# Patient Record
Sex: MALE | URBAN - METROPOLITAN AREA
[De-identification: names, ages, dates, MRNs, and addresses within clinical notes are randomized per-mention and may not be internally consistent; named-entity substitution may affect disease eponyms.]

---

## 2020-08-27 ENCOUNTER — NEW PATIENT (OUTPATIENT)
Dept: URBAN - METROPOLITAN AREA CLINIC 64 | Facility: CLINIC | Age: 60
End: 2020-08-27
Payer: MEDICARE

## 2020-08-27 DIAGNOSIS — Z79.4 LONG TERM (CURRENT) USE OF INSULIN: ICD-10-CM

## 2020-08-27 DIAGNOSIS — H43.12 VITREOUS HEMORRHAGE, LEFT EYE: Primary | ICD-10-CM

## 2020-08-27 PROCEDURE — 92134 CPTRZ OPH DX IMG PST SGM RTA: CPT | Performed by: OPHTHALMOLOGY

## 2020-08-27 PROCEDURE — 67028 INJECTION EYE DRUG: CPT | Performed by: OPHTHALMOLOGY

## 2020-08-27 PROCEDURE — 92004 COMPRE OPH EXAM NEW PT 1/>: CPT | Performed by: OPHTHALMOLOGY

## 2020-08-27 PROCEDURE — 65800 DRAINAGE OF EYE: CPT | Performed by: OPHTHALMOLOGY

## 2020-08-27 ASSESSMENT — INTRAOCULAR PRESSURE
OS: 15
OD: 14

## 2020-10-13 ENCOUNTER — FOLLOW UP ESTABLISHED (OUTPATIENT)
Dept: URBAN - METROPOLITAN AREA CLINIC 64 | Facility: CLINIC | Age: 60
End: 2020-10-13
Payer: MEDICARE

## 2020-10-13 DIAGNOSIS — H40.1133 PRIMARY OPEN-ANGLE GLAUCOMA, BILATERAL, SEVERE STAGE: ICD-10-CM

## 2020-10-13 PROCEDURE — 92250 FUNDUS PHOTOGRAPHY W/I&R: CPT | Performed by: OPHTHALMOLOGY

## 2020-10-13 PROCEDURE — 67028 INJECTION EYE DRUG: CPT | Performed by: OPHTHALMOLOGY

## 2020-10-13 PROCEDURE — 92014 COMPRE OPH EXAM EST PT 1/>: CPT | Performed by: OPHTHALMOLOGY

## 2020-10-13 PROCEDURE — 65800 DRAINAGE OF EYE: CPT | Performed by: OPHTHALMOLOGY

## 2020-10-13 PROCEDURE — 92235 FLUORESCEIN ANGRPH MLTIFRAME: CPT | Performed by: OPHTHALMOLOGY

## 2020-10-13 ASSESSMENT — INTRAOCULAR PRESSURE
OS: 12
OD: 16
OS: 13

## 2020-12-17 ENCOUNTER — FOLLOW UP ESTABLISHED (OUTPATIENT)
Dept: URBAN - METROPOLITAN AREA CLINIC 64 | Facility: CLINIC | Age: 60
End: 2020-12-17
Payer: MEDICARE

## 2020-12-17 DIAGNOSIS — E11.3513 TYPE 2 DIABETES MELLITUS WITH PROLIFERATIVE DIABETIC RETINOPATHY WITH MACULAR EDEMA, BILATERAL: Primary | ICD-10-CM

## 2020-12-17 PROCEDURE — 92134 CPTRZ OPH DX IMG PST SGM RTA: CPT | Performed by: OPHTHALMOLOGY

## 2020-12-17 PROCEDURE — 65800 DRAINAGE OF EYE: CPT | Performed by: OPHTHALMOLOGY

## 2020-12-17 PROCEDURE — 67028 INJECTION EYE DRUG: CPT | Performed by: OPHTHALMOLOGY

## 2020-12-17 RX ORDER — BRIMONIDINE TARTRATE 2 MG/ML
0.2 % SOLUTION/ DROPS OPHTHALMIC
Qty: 0 | Refills: 0 | Status: ACTIVE
Start: 2020-12-17

## 2020-12-17 ASSESSMENT — INTRAOCULAR PRESSURE
OS: 14
OS: 10
OD: 14

## 2021-03-02 ENCOUNTER — FOLLOW UP ESTABLISHED (OUTPATIENT)
Dept: URBAN - METROPOLITAN AREA CLINIC 64 | Facility: CLINIC | Age: 61
End: 2021-03-02
Payer: MEDICARE

## 2021-03-02 PROCEDURE — 99214 OFFICE O/P EST MOD 30 MIN: CPT | Performed by: OPHTHALMOLOGY

## 2021-03-02 PROCEDURE — 92134 CPTRZ OPH DX IMG PST SGM RTA: CPT | Performed by: OPHTHALMOLOGY

## 2021-03-02 PROCEDURE — 65800 DRAINAGE OF EYE: CPT | Performed by: OPHTHALMOLOGY

## 2021-03-02 PROCEDURE — 67028 INJECTION EYE DRUG: CPT | Performed by: OPHTHALMOLOGY

## 2021-03-02 ASSESSMENT — INTRAOCULAR PRESSURE
OD: 12
OS: 11
OS: 10

## 2021-06-17 ENCOUNTER — OFFICE VISIT (OUTPATIENT)
Dept: URBAN - METROPOLITAN AREA CLINIC 64 | Facility: CLINIC | Age: 61
End: 2021-06-17
Payer: MEDICARE

## 2021-06-17 PROCEDURE — 67028 INJECTION EYE DRUG: CPT | Performed by: OPHTHALMOLOGY

## 2021-06-17 PROCEDURE — 92134 CPTRZ OPH DX IMG PST SGM RTA: CPT | Performed by: OPHTHALMOLOGY

## 2021-06-17 PROCEDURE — 99214 OFFICE O/P EST MOD 30 MIN: CPT | Performed by: OPHTHALMOLOGY

## 2021-06-17 PROCEDURE — 65800 DRAINAGE OF EYE: CPT | Performed by: OPHTHALMOLOGY

## 2021-06-17 ASSESSMENT — INTRAOCULAR PRESSURE
OS: 17
OS: 10
OD: 14

## 2021-06-17 NOTE — IMPRESSION/PLAN
Impression: PDR w PALLOR nerves, BLIND OD - Prior PRP OU - Vit hmg OS -  (MUCH imprv'd w inj Camila Niall xAug '20) Plan: Hx:[[DM POOR CONTROL prior - now 6s, 7s. NLP OD PALLOR NERVE /SEVERE prior issues LIMIT - frm [facility] w VIT HMG/VA loss only good eye OS Aug '20 - Tx w inj -- H/o PRP DENSE and VIT surg prior tx w inj --Cont conservative mngmt (risk SURG TOO HIGH)  Extend / maintain]]. TODAY Avastin OS inj (proc note) w AC Tap to 10 (Vit tap)      Future Exam ?  
      RTC [facility] check VA and IOP 3-4w. RTC [facility] 14w ND/inj/OCT  plan Avastin  OS w AC / Vit TAP - Extend / Maintain q3m ?

## 2021-08-26 ENCOUNTER — OFFICE VISIT (OUTPATIENT)
Dept: URBAN - METROPOLITAN AREA CLINIC 64 | Facility: CLINIC | Age: 61
End: 2021-08-26
Payer: MEDICARE

## 2021-08-26 PROCEDURE — 92134 CPTRZ OPH DX IMG PST SGM RTA: CPT | Performed by: OPHTHALMOLOGY

## 2021-08-26 PROCEDURE — 65800 DRAINAGE OF EYE: CPT | Performed by: OPHTHALMOLOGY

## 2021-08-26 PROCEDURE — 67028 INJECTION EYE DRUG: CPT | Performed by: OPHTHALMOLOGY

## 2021-08-26 RX ORDER — GABAPENTIN 300 MG/1
300 MG CAPSULE ORAL
Qty: 0 | Refills: 0 | Status: ACTIVE
Start: 2021-08-26

## 2021-08-26 ASSESSMENT — INTRAOCULAR PRESSURE
OS: 10
OD: 12

## 2021-08-26 NOTE — IMPRESSION/PLAN
Impression: Primary open-angle glaucoma, severe Plan:  DEEP C/d, rim thin. MUST stay on Gtts as per [address] Today TAP AC to 9 OS.   LIMITING issue

## 2021-12-07 ENCOUNTER — OFFICE VISIT (OUTPATIENT)
Dept: URBAN - METROPOLITAN AREA CLINIC 64 | Facility: CLINIC | Age: 61
End: 2021-12-07
Payer: MEDICARE

## 2021-12-07 PROCEDURE — 67028 INJECTION EYE DRUG: CPT | Performed by: OPHTHALMOLOGY

## 2021-12-07 PROCEDURE — 99214 OFFICE O/P EST MOD 30 MIN: CPT | Performed by: OPHTHALMOLOGY

## 2021-12-07 PROCEDURE — 92134 CPTRZ OPH DX IMG PST SGM RTA: CPT | Performed by: OPHTHALMOLOGY

## 2021-12-07 PROCEDURE — 65800 DRAINAGE OF EYE: CPT | Performed by: OPHTHALMOLOGY

## 2021-12-07 ASSESSMENT — INTRAOCULAR PRESSURE
OS: 10
OS: 14
OD: 18

## 2021-12-07 NOTE — IMPRESSION/PLAN
Impression: PDR w PALLOR nerves, BLIND OD - Prior PRP OU - Vit hmg OS -  (MUCH imprv'd w inj Lei Poster xAug '20) Plan: Hx:[[DM POOR CONTROL prior - now 6s, 7s. NLP OD PALLOR NERVE /SEVERE prior issues LIMIT - frm [facility] w VIT HMG/VA loss only good eye OS Aug '20 - Tx w inj -- H/o PRP DENSE and VIT surg prior tx w inj --Cont conservative mngmt (risk SURG TOO HIGH)  Extend / maintain]]. TODAY Avastin OS inj (proc note) w AC Tap to 10 (Vit tap)      Future ND??  
      RTC [facility] check VA and IOP 3-4w. RTC [facility] 14w pos HRA / plan Avstn OS w AC/ Vit TAP - Extend/Maintain q3m ?

## 2021-12-07 NOTE — IMPRESSION/PLAN
Impression: Primary open-angle glaucoma, severe Plan: DEEP C/d, rim thin on REPEATED EXAM eval TODAY. REMINDED pt:  MUST stay on Gtts as per [address] Today TAP AC to 10 OS.   LIMITING issue

## 2022-03-29 ENCOUNTER — OFFICE VISIT (OUTPATIENT)
Dept: URBAN - METROPOLITAN AREA CLINIC 64 | Facility: CLINIC | Age: 62
End: 2022-03-29
Payer: MEDICARE

## 2022-03-29 PROCEDURE — 65800 DRAINAGE OF EYE: CPT | Performed by: OPHTHALMOLOGY

## 2022-03-29 PROCEDURE — 67028 INJECTION EYE DRUG: CPT | Performed by: OPHTHALMOLOGY

## 2022-03-29 PROCEDURE — 92134 CPTRZ OPH DX IMG PST SGM RTA: CPT | Performed by: OPHTHALMOLOGY

## 2022-03-29 PROCEDURE — 92242 FLUORESCEIN&ICG ANGIOGRAPHY: CPT | Performed by: OPHTHALMOLOGY

## 2022-03-29 PROCEDURE — 92250 FUNDUS PHOTOGRAPHY W/I&R: CPT | Performed by: OPHTHALMOLOGY

## 2022-03-29 ASSESSMENT — INTRAOCULAR PRESSURE
OS: 9
OD: 13
OS: 10

## 2022-03-29 NOTE — IMPRESSION/PLAN
Impression: Primary open-angle glaucoma, severe Plan: DEEP C/d, rim thin  REMINDED pt:  MUST stay on Gtts as per [address] Today TAP AC to 9 OS.   LIMITING issue

## 2022-03-29 NOTE — IMPRESSION/PLAN
Impression: PDR w PALLOR nerves, BLIND OD - Prior PRP OU - Vit hmg OS -  (MUCH imprv'd w inj Nereida Jarrell xAug '20) Plan: Hx:[[DM POOR CONTROL prior - now 6s, 7s. NLP OD PALLOR NERVE /SEVERE prior issues LIMIT - frm [facility] w VIT HMG/VA loss only good eye OS Aug '20 - Tx w inj -- H/o PRP DENSE and VIT surg prior tx w inj --Cont conservative mngmt (risk SURG TOO HIGH)  Extend / maintain]]. TODAY Avastin OS inj (proc note) w AC Tap to 10 (Vit tap)      Future ND??  
      RTC [facility] check VA and IOP 3-4w. RTC [facility] 14w ND/OCT/plan Avstn OS w AC/ Vit TAP - Extend/Maintain q3m ?

## 2022-07-19 ENCOUNTER — OFFICE VISIT (OUTPATIENT)
Dept: URBAN - METROPOLITAN AREA CLINIC 64 | Facility: CLINIC | Age: 62
End: 2022-07-19
Payer: MEDICARE

## 2022-07-19 DIAGNOSIS — H40.1133 PRIMARY OPEN-ANGLE GLAUCOMA, BILATERAL, SEVERE STAGE: ICD-10-CM

## 2022-07-19 DIAGNOSIS — E11.3513 TYPE 2 DIABETES MELLITUS WITH PROLIFERATIVE DIABETIC RETINOPATHY WITH MACULAR EDEMA, BILATERAL: Primary | ICD-10-CM

## 2022-07-19 PROCEDURE — 99214 OFFICE O/P EST MOD 30 MIN: CPT | Performed by: OPHTHALMOLOGY

## 2022-07-19 PROCEDURE — 67028 INJECTION EYE DRUG: CPT | Performed by: OPHTHALMOLOGY

## 2022-07-19 PROCEDURE — 92134 CPTRZ OPH DX IMG PST SGM RTA: CPT | Performed by: OPHTHALMOLOGY

## 2022-07-19 ASSESSMENT — INTRAOCULAR PRESSURE
OS: 9
OD: 15

## 2022-07-19 NOTE — IMPRESSION/PLAN
Impression: Primary open-angle glaucoma, severe Plan: DEEP C/d, rim thin  REMINDED pt:  MUST stay on Gtts as per [address] Repeated exam shows no nerve hmg and IOP fair, tapped again (See below) NO VIT HMG or NV worsening and no DME recur. Today TAP AC to 9 OS. LIMITING issue  -- ASK [place] or HALEYJ to exam as well.   SONALI

## 2022-07-19 NOTE — IMPRESSION/PLAN
Impression: PDR w PALLOR -  BLIND OD - Prior PRP OU - Vit hmg OS -  (MUCH imprv'd w inj Domingo Lesley xAug '20) Plan: Hx:[[DM POOR CONTROL prior - now 6s, 7s. NLP OD PALLOR NERVE /SEVERE prior issues LIMIT - frm [facility] w VIT HMG/VA loss only good eye OS Aug '20 - Tx w inj -- H/o PRP DENSE and VIT surg prior tx w inj --Cont conservative mngmt (risk SURG TOO HIGH)  Extend / maintain]]. TODAY Avastin OS inj (proc note) w AC Tap to 9 (Vit tap)    Future ND ?? RTC [facility] 14w dil, OCT, eval -h/o Avstn OS w AC/Vit TAP - Maintain q3m ? 
    KEEP f/u  [facility] check VA and IOP

## 2022-08-26 ENCOUNTER — OFFICE VISIT (OUTPATIENT)
Dept: URBAN - METROPOLITAN AREA CLINIC 64 | Facility: CLINIC | Age: 62
End: 2022-08-26
Payer: MEDICARE

## 2022-08-26 DIAGNOSIS — H26.492 OTHER SECONDARY CATARACT, LEFT EYE: ICD-10-CM

## 2022-08-26 DIAGNOSIS — Z79.4 LONG TERM (CURRENT) USE OF INSULIN: ICD-10-CM

## 2022-08-26 DIAGNOSIS — E11.3513 TYPE 2 DIAB WITH PROLIF DIAB RTNOP WITH MACULAR EDEMA, BI: ICD-10-CM

## 2022-08-26 DIAGNOSIS — H40.1133 PRIMARY OPEN-ANGLE GLAUCOMA, BILATERAL, SEVERE STAGE: Primary | ICD-10-CM

## 2022-08-26 DIAGNOSIS — H43.12 VITREOUS HEMORRHAGE, LEFT EYE: ICD-10-CM

## 2022-08-26 PROCEDURE — 99214 OFFICE O/P EST MOD 30 MIN: CPT | Performed by: STUDENT IN AN ORGANIZED HEALTH CARE EDUCATION/TRAINING PROGRAM

## 2022-08-26 ASSESSMENT — INTRAOCULAR PRESSURE
OS: 7
OD: 9

## 2022-08-26 NOTE — IMPRESSION/PLAN
Impression: Primary open-angle glaucoma, severe Plan: Today IOP is stable, end stage disease. Recommend continued topicals -Brimonidine OU BID 
-Latanoprost OU Qhs

Follow up 3 months IOP check
-Attempt a [facility] 10-2

## 2022-08-26 NOTE — IMPRESSION/PLAN
Impression: PDR w PALLOR -  BLIND OD - Prior PRP OU - Vit hmg OS -  (MUCH imprv'd w inj Green Valeriano xAug '20) Plan: RTC [facility] 14w dil, OCT, eval -h/o Avstn OS w AC/Vit TAP - every 3 months

## 2022-08-26 NOTE — IMPRESSION/PLAN
Impression: Other secondary cataract, left eye: H26.492. Plan: Yag would benefit patient potentially with more light to the retina and would make viewing easier for disease management. Recomend yag cap OS Discussed condition in detail. Recommend YAG. Discussed procedure, risks, and benefits with patient. Advised treatment can increase floaters that may be permanent, pt understands. Patient elects to proceed.   Schedule YAG

## 2022-08-26 NOTE — IMPRESSION/PLAN
Impression: Type 2 diab with prolif diab rtnop with macular edema, bi: J13.9477.  Plan: Continue to follow with Retina for injections

## 2022-09-21 ENCOUNTER — POST-OPERATIVE VISIT (OUTPATIENT)
Dept: URBAN - METROPOLITAN AREA CLINIC 64 | Facility: CLINIC | Age: 62
End: 2022-09-21
Payer: MEDICARE

## 2022-09-21 DIAGNOSIS — Z48.810 ENCOUNTER FOR SURGICAL AFTERCARE FOLLOWING SURGERY ON A SENSE ORGAN: Primary | ICD-10-CM

## 2022-09-21 PROCEDURE — 99024 POSTOP FOLLOW-UP VISIT: CPT | Performed by: OPTOMETRIST

## 2022-09-21 ASSESSMENT — INTRAOCULAR PRESSURE
OS: 10
OD: 20

## 2022-09-21 NOTE — IMPRESSION/PLAN
Impression:  Encounter for surgical aftercare following surgery on a sense organ  Z48.810. Plan: Keep FU with retina as scheduled.

## 2022-10-20 ENCOUNTER — OFFICE VISIT (OUTPATIENT)
Dept: URBAN - METROPOLITAN AREA CLINIC 64 | Facility: CLINIC | Age: 62
End: 2022-10-20
Payer: MEDICARE

## 2022-10-20 DIAGNOSIS — E11.3513 TYPE 2 DIABETES MELLITUS WITH PROLIFERATIVE DIABETIC RETINOPATHY WITH MACULAR EDEMA, BILATERAL: Primary | ICD-10-CM

## 2022-10-20 DIAGNOSIS — H40.1133 PRIMARY OPEN-ANGLE GLAUCOMA, BILATERAL, SEVERE STAGE: ICD-10-CM

## 2022-10-20 DIAGNOSIS — Z79.4 LONG TERM (CURRENT) USE OF INSULIN: ICD-10-CM

## 2022-10-20 PROCEDURE — 99214 OFFICE O/P EST MOD 30 MIN: CPT | Performed by: OPHTHALMOLOGY

## 2022-10-20 PROCEDURE — 67028 INJECTION EYE DRUG: CPT | Performed by: OPHTHALMOLOGY

## 2022-10-20 PROCEDURE — 65800 DRAINAGE OF EYE: CPT | Performed by: OPHTHALMOLOGY

## 2022-10-20 PROCEDURE — 92134 CPTRZ OPH DX IMG PST SGM RTA: CPT | Performed by: OPHTHALMOLOGY

## 2022-10-20 ASSESSMENT — INTRAOCULAR PRESSURE
OS: 8
OD: 7

## 2022-10-20 NOTE — IMPRESSION/PLAN
Impression: PDR w PALLOR -  BLIND OD - Prior PRP OU - Vit hmg OS -  (MUCH imprv'd w inj Nolene Ny xAug '20) Plan: Hx:[[DM POOR CONTROL prior - now 6s, 7s. NLP OD PALLOR NERVE /SEVERE prior issues LIMIT - frm [facility] w VIT HMG/VA loss only good eye OS Aug '20 - Tx w inj -- H/o PRP DENSE and VIT surg prior tx w inj --Cont conservative mngmt (risk SURG TOO HIGH)  Extend / maintain]]. TODAY Avstn OS inj (proc note) w AC Tap to 8 (Vit tap)    Future ND ?? RTC [facility] 14w pos FA / plan Avstn OS w AC/Vit TAP - Maintain q3m ? 
    KEEP f/u  [facility] check VA and IOP

## 2022-10-20 NOTE — IMPRESSION/PLAN
Impression: Primary open-angle glaucoma, severe Plan: TODAY repeat exam of nerves -- DEEP C/d, rim thin  REMINDED pt:  MUST stay on Gtts as per [address] [facility]. Today TAP AC to 8 OS w injx . . .  THIS REMAINS A LIMITING issue

## 2022-11-15 ENCOUNTER — OFFICE VISIT (OUTPATIENT)
Dept: URBAN - METROPOLITAN AREA CLINIC 64 | Facility: CLINIC | Age: 62
End: 2022-11-15
Payer: MEDICARE

## 2022-11-15 DIAGNOSIS — E11.3513 TYPE 2 DIABETES MELLITUS WITH PROLIFERATIVE DIABETIC RETINOPATHY WITH MACULAR EDEMA, BILATERAL: ICD-10-CM

## 2022-11-15 DIAGNOSIS — H40.1133 PRIMARY OPEN-ANGLE GLAUCOMA, BILATERAL, SEVERE STAGE: Primary | ICD-10-CM

## 2022-11-15 DIAGNOSIS — H43.12 VITREOUS HEMORRHAGE, LEFT EYE: ICD-10-CM

## 2022-11-15 PROCEDURE — 92083 EXTENDED VISUAL FIELD XM: CPT | Performed by: STUDENT IN AN ORGANIZED HEALTH CARE EDUCATION/TRAINING PROGRAM

## 2022-11-15 PROCEDURE — 99214 OFFICE O/P EST MOD 30 MIN: CPT | Performed by: STUDENT IN AN ORGANIZED HEALTH CARE EDUCATION/TRAINING PROGRAM

## 2022-11-15 PROCEDURE — 92133 CPTRZD OPH DX IMG PST SGM ON: CPT | Performed by: STUDENT IN AN ORGANIZED HEALTH CARE EDUCATION/TRAINING PROGRAM

## 2022-11-15 ASSESSMENT — INTRAOCULAR PRESSURE
OS: 11
OD: 15

## 2022-11-15 NOTE — IMPRESSION/PLAN
Impression: PDR w PALLOR -  BLIND OD - Prior PRP OU - Vit hmg OS -  (MUCH imprv'd w inj Camila Brooks '20) Plan: Continue all care with Dr Camila Tierney for Avastin OS

## 2022-11-15 NOTE — IMPRESSION/PLAN
Impression: Type 2 diabetes mellitus with proliferative diabetic retinopathy with macular edema, bilateral: D96.0816.  Plan:

## 2022-11-15 NOTE — IMPRESSION/PLAN
Impression: Primary open-angle glaucoma, bilateral, severe stage: U15.1351. Plan: Complete NLP OD, Today 10-2 with central island remains, Baseline scan, repeat in 6 months
-Continue Brimonidine OU BID 
-Latanoprost OU Qhs

Follow up 3 months IOP check

## 2023-01-26 ENCOUNTER — OFFICE VISIT (OUTPATIENT)
Dept: URBAN - METROPOLITAN AREA CLINIC 64 | Facility: CLINIC | Age: 63
End: 2023-01-26
Payer: MEDICARE

## 2023-01-26 DIAGNOSIS — E11.3513 TYPE 2 DIABETES MELLITUS WITH PROLIFERATIVE DIABETIC RETINOPATHY WITH MACULAR EDEMA, BILATERAL: Primary | ICD-10-CM

## 2023-01-26 DIAGNOSIS — H40.1133 PRIMARY OPEN-ANGLE GLAUCOMA, BILATERAL, SEVERE STAGE: ICD-10-CM

## 2023-01-26 PROCEDURE — 92134 CPTRZ OPH DX IMG PST SGM RTA: CPT | Performed by: OPHTHALMOLOGY

## 2023-01-26 PROCEDURE — 92235 FLUORESCEIN ANGRPH MLTIFRAME: CPT | Performed by: OPHTHALMOLOGY

## 2023-01-26 PROCEDURE — 92250 FUNDUS PHOTOGRAPHY W/I&R: CPT | Performed by: OPHTHALMOLOGY

## 2023-01-26 PROCEDURE — 65800 DRAINAGE OF EYE: CPT | Performed by: OPHTHALMOLOGY

## 2023-01-26 PROCEDURE — 67028 INJECTION EYE DRUG: CPT | Performed by: OPHTHALMOLOGY

## 2023-01-26 ASSESSMENT — INTRAOCULAR PRESSURE
OD: 10
OS: 7
OS: 8

## 2023-01-26 NOTE — IMPRESSION/PLAN
Impression: Primary open-angle glaucoma, bilateral, severe stage: D23.1764.  Plan: See other note - TODAY AC TAP to 8 (Vit tap) S/P Injection OS

## 2023-01-26 NOTE — IMPRESSION/PLAN
Impression: PDR w PALLOR -  BLIND OD - Prior PRP OU - Vit hmg OS -  (MUCH imprv'd w inj Lei Poster xAug '20) Plan: Hx:[[DM POOR CONTROL prior - now 6s, 7s. NLP OD PALLOR NERVE /SEVERE prior issues LIMIT - frm [facility] w VIT HMG/VA loss only good eye OS Aug '20 - Tx w inj -- H/o PRP DENSE and VIT surg prior tx w inj --Cont conservative mngmt (risk SURG TOO HIGH)  Extend / maintain]]. TODAY Avstn OS inj (proc note) w AC Tap to 8 (Vit tap)    Future Exam ??  
       RTC [facility] 14w ND/inj/OCT plan Avstn OS w AC/Vit TAP - Maintain q3m ? 
    KEEP f/u  [facility] check VA and IOP

## 2023-02-24 ENCOUNTER — OFFICE VISIT (OUTPATIENT)
Dept: URBAN - METROPOLITAN AREA CLINIC 64 | Facility: CLINIC | Age: 63
End: 2023-02-24
Payer: MEDICARE

## 2023-02-24 DIAGNOSIS — H40.1133 PRIMARY OPEN-ANGLE GLAUCOMA, BILATERAL, SEVERE STAGE: Primary | ICD-10-CM

## 2023-02-24 DIAGNOSIS — E11.3513 TYPE 2 DIABETES MELLITUS WITH PROLIFERATIVE DIABETIC RETINOPATHY WITH MACULAR EDEMA, BILATERAL: ICD-10-CM

## 2023-02-24 PROCEDURE — 99213 OFFICE O/P EST LOW 20 MIN: CPT | Performed by: STUDENT IN AN ORGANIZED HEALTH CARE EDUCATION/TRAINING PROGRAM

## 2023-02-24 ASSESSMENT — INTRAOCULAR PRESSURE
OD: 13
OS: 14

## 2023-02-24 NOTE — IMPRESSION/PLAN
Impression: PDR w PALLOR -  BLIND OD - Prior PRP OU - Vit hmg OS -  (MUCH imprv'd w inj Eli Sigala xAjean '20) Plan: Continue all care with Dr Eli Sigala for Avastin OS

## 2023-02-24 NOTE — IMPRESSION/PLAN
Impression: Primary open-angle glaucoma, bilateral, severe stage: V26.0456. Plan: IOP stable w/o side effects. No refills needed. Good compliance. 
-Continue Brimonidine OU BID 
-Latanoprost OU Qhs

Follow up 3 months HVF 10-2 and RNFL OCT

## 2023-02-24 NOTE — IMPRESSION/PLAN
Impression: Type 2 diabetes mellitus with proliferative diabetic retinopathy with macular edema, bilateral: P85.3321.  Plan:

## 2023-06-26 ENCOUNTER — OFFICE VISIT (OUTPATIENT)
Dept: URBAN - METROPOLITAN AREA CLINIC 64 | Facility: LOCATION | Age: 63
End: 2023-06-26
Payer: MEDICARE

## 2023-06-26 DIAGNOSIS — H40.1133 PRIMARY OPEN-ANGLE GLAUCOMA, BILATERAL, SEVERE STAGE: Primary | ICD-10-CM

## 2023-06-26 PROCEDURE — 92083 EXTENDED VISUAL FIELD XM: CPT | Performed by: STUDENT IN AN ORGANIZED HEALTH CARE EDUCATION/TRAINING PROGRAM

## 2023-07-07 ENCOUNTER — OFFICE VISIT (OUTPATIENT)
Dept: URBAN - METROPOLITAN AREA CLINIC 64 | Facility: LOCATION | Age: 63
End: 2023-07-07
Payer: MEDICARE

## 2023-07-07 DIAGNOSIS — H40.1133 PRIMARY OPEN-ANGLE GLAUCOMA, BILATERAL, SEVERE STAGE: Primary | ICD-10-CM

## 2023-07-07 DIAGNOSIS — E11.3513 TYPE 2 DIABETES MELLITUS WITH PROLIFERATIVE DIABETIC RETINOPATHY WITH MACULAR EDEMA, BILATERAL: ICD-10-CM

## 2023-07-07 PROCEDURE — 99214 OFFICE O/P EST MOD 30 MIN: CPT | Performed by: STUDENT IN AN ORGANIZED HEALTH CARE EDUCATION/TRAINING PROGRAM

## 2023-07-07 RX ORDER — BRIMONIDINE TARTRATE 2 MG/ML
0.2 % SOLUTION/ DROPS OPHTHALMIC
Qty: 5 | Refills: 6 | Status: ACTIVE
Start: 2023-07-07

## 2023-07-07 RX ORDER — LATANOPROST 50 UG/ML
0.005 % SOLUTION OPHTHALMIC
Qty: 5 | Refills: 6 | Status: ACTIVE
Start: 2023-07-07

## 2023-07-07 ASSESSMENT — INTRAOCULAR PRESSURE
OS: 8
OD: 5

## 2023-07-07 NOTE — IMPRESSION/PLAN
Impression: Primary open-angle glaucoma, bilateral, severe stage: R46.9520. Plan: IOP today stable w/o side effects. 
-Continue 
-Brimonidine OU BID 
-Latanoprost OU Qhs

Recommend follow up in [facility] ( it is a 3hr drive to [place] for IOP checks). If unable to see anyone, patient has been notified to call [facility] to arrange apt in 3 months.

## 2023-07-07 NOTE — IMPRESSION/PLAN
Impression: PDR w PALLOR -  BLIND OD - Prior PRP OU - Vit hmg OS -  (MUCH imprv'd w inj Crystal Brooks '20) Plan: Recommend comfort measures OD and monitoring only for now OS, appears stable on exam today, patient can schedule to see retina should things change OS

## 2023-10-20 ENCOUNTER — OFFICE VISIT (OUTPATIENT)
Dept: URBAN - METROPOLITAN AREA CLINIC 64 | Facility: LOCATION | Age: 63
End: 2023-10-20
Payer: MEDICARE

## 2023-10-20 DIAGNOSIS — E11.3513 TYPE 2 DIABETES MELLITUS WITH PROLIFERATIVE DIABETIC RETINOPATHY WITH MACULAR EDEMA, BILATERAL: ICD-10-CM

## 2023-10-20 DIAGNOSIS — H25.811 COMBINED FORMS OF AGE-RELATED CATARACT, RIGHT EYE: ICD-10-CM

## 2023-10-20 DIAGNOSIS — H40.1133 PRIMARY OPEN-ANGLE GLAUCOMA, BILATERAL, SEVERE STAGE: Primary | ICD-10-CM

## 2023-10-20 DIAGNOSIS — Z96.1 PRESENCE OF INTRAOCULAR LENS: ICD-10-CM

## 2023-10-20 DIAGNOSIS — H11.042 PERIPHERAL PTERYGIUM, STATIONARY, LEFT EYE: ICD-10-CM

## 2023-10-20 PROCEDURE — 99214 OFFICE O/P EST MOD 30 MIN: CPT | Performed by: STUDENT IN AN ORGANIZED HEALTH CARE EDUCATION/TRAINING PROGRAM

## 2023-10-20 PROCEDURE — 92133 CPTRZD OPH DX IMG PST SGM ON: CPT | Performed by: STUDENT IN AN ORGANIZED HEALTH CARE EDUCATION/TRAINING PROGRAM

## 2023-10-20 ASSESSMENT — INTRAOCULAR PRESSURE
OS: 12
OD: 7

## 2023-12-08 ENCOUNTER — OFFICE VISIT (OUTPATIENT)
Dept: URBAN - METROPOLITAN AREA CLINIC 64 | Facility: LOCATION | Age: 63
End: 2023-12-08
Payer: MEDICARE

## 2023-12-08 DIAGNOSIS — H40.1133 PRIMARY OPEN-ANGLE GLAUCOMA, BILATERAL, SEVERE STAGE: Primary | ICD-10-CM

## 2023-12-08 PROCEDURE — 99213 OFFICE O/P EST LOW 20 MIN: CPT | Performed by: STUDENT IN AN ORGANIZED HEALTH CARE EDUCATION/TRAINING PROGRAM

## 2023-12-08 PROCEDURE — 92083 EXTENDED VISUAL FIELD XM: CPT | Performed by: STUDENT IN AN ORGANIZED HEALTH CARE EDUCATION/TRAINING PROGRAM

## 2023-12-08 ASSESSMENT — INTRAOCULAR PRESSURE
OS: 9
OD: 12

## 2024-03-08 ENCOUNTER — OFFICE VISIT (OUTPATIENT)
Dept: URBAN - METROPOLITAN AREA CLINIC 64 | Facility: LOCATION | Age: 64
End: 2024-03-08
Payer: MEDICARE

## 2024-03-08 DIAGNOSIS — H40.1133 PRIMARY OPEN-ANGLE GLAUCOMA, BILATERAL, SEVERE STAGE: Primary | ICD-10-CM

## 2024-03-08 PROCEDURE — 99214 OFFICE O/P EST MOD 30 MIN: CPT | Performed by: STUDENT IN AN ORGANIZED HEALTH CARE EDUCATION/TRAINING PROGRAM

## 2024-03-08 RX ORDER — DORZOLAMIDE HYDROCHLORIDE AND TIMOLOL MALEATE 20; 5 MG/ML; MG/ML
SOLUTION/ DROPS OPHTHALMIC
Qty: 3 | Refills: 3 | Status: ACTIVE
Start: 2024-03-08

## 2024-03-08 ASSESSMENT — INTRAOCULAR PRESSURE
OD: 16
OS: 16
OS: 14
OD: 18

## 2024-04-26 ENCOUNTER — OFFICE VISIT (OUTPATIENT)
Dept: URBAN - METROPOLITAN AREA CLINIC 64 | Facility: LOCATION | Age: 64
End: 2024-04-26
Payer: MEDICARE

## 2024-04-26 DIAGNOSIS — H40.1133 PRIMARY OPEN-ANGLE GLAUCOMA, BILATERAL, SEVERE STAGE: Primary | ICD-10-CM

## 2024-04-26 PROCEDURE — 99213 OFFICE O/P EST LOW 20 MIN: CPT | Performed by: STUDENT IN AN ORGANIZED HEALTH CARE EDUCATION/TRAINING PROGRAM

## 2024-04-26 RX ORDER — DORZOLAMIDE HYDROCHLORIDE AND TIMOLOL MALEATE 20; 5 MG/ML; MG/ML
SOLUTION/ DROPS OPHTHALMIC
Qty: 3 | Refills: 3 | Status: ACTIVE
Start: 2024-04-26

## 2024-04-26 RX ORDER — LATANOPROST 50 UG/ML
0.005 % SOLUTION OPHTHALMIC
Qty: 5 | Refills: 6 | Status: ACTIVE
Start: 2024-04-26

## 2024-04-26 ASSESSMENT — INTRAOCULAR PRESSURE
OD: 8
OS: 4
OS: 9
OD: 13

## 2024-08-02 ENCOUNTER — OFFICE VISIT (OUTPATIENT)
Dept: URBAN - METROPOLITAN AREA CLINIC 64 | Facility: LOCATION | Age: 64
End: 2024-08-02
Payer: MEDICARE

## 2024-08-02 DIAGNOSIS — H40.1133 PRIMARY OPEN-ANGLE GLAUCOMA, BILATERAL, SEVERE STAGE: Primary | ICD-10-CM

## 2024-08-02 PROCEDURE — 99213 OFFICE O/P EST LOW 20 MIN: CPT | Performed by: STUDENT IN AN ORGANIZED HEALTH CARE EDUCATION/TRAINING PROGRAM

## 2024-08-02 ASSESSMENT — INTRAOCULAR PRESSURE
OD: 18
OS: 16

## 2024-10-04 ENCOUNTER — OFFICE VISIT (OUTPATIENT)
Dept: URBAN - METROPOLITAN AREA CLINIC 64 | Facility: LOCATION | Age: 64
End: 2024-10-04
Payer: MEDICARE

## 2024-10-04 DIAGNOSIS — H40.1133 PRIMARY OPEN-ANGLE GLAUCOMA, BILATERAL, SEVERE STAGE: Primary | ICD-10-CM

## 2024-10-04 PROCEDURE — 99214 OFFICE O/P EST MOD 30 MIN: CPT | Performed by: STUDENT IN AN ORGANIZED HEALTH CARE EDUCATION/TRAINING PROGRAM

## 2024-10-04 RX ORDER — DORZOLAMIDE HYDROCHLORIDE AND TIMOLOL MALEATE 20; 5 MG/ML; MG/ML
SOLUTION/ DROPS OPHTHALMIC
Qty: 3 | Refills: 3 | Status: ACTIVE
Start: 2024-10-04

## 2024-10-04 RX ORDER — LATANOPROST 50 UG/ML
0.005 % SOLUTION OPHTHALMIC
Qty: 3 | Refills: 3 | Status: ACTIVE
Start: 2024-10-04

## 2024-10-04 RX ORDER — BRIMONIDINE TARTRATE 2 MG/ML
0.2 % SOLUTION/ DROPS OPHTHALMIC
Qty: 3 | Refills: 3 | Status: ACTIVE
Start: 2024-10-04

## 2024-10-04 ASSESSMENT — INTRAOCULAR PRESSURE
OD: 21
OS: 19

## 2024-12-13 ENCOUNTER — OFFICE VISIT (OUTPATIENT)
Dept: URBAN - METROPOLITAN AREA CLINIC 64 | Facility: LOCATION | Age: 64
End: 2024-12-13
Payer: MEDICARE

## 2024-12-13 DIAGNOSIS — H40.1133 PRIMARY OPEN-ANGLE GLAUCOMA, BILATERAL, SEVERE STAGE: Primary | ICD-10-CM

## 2024-12-13 DIAGNOSIS — H11.042 PERIPHERAL PTERYGIUM, STATIONARY, LEFT EYE: ICD-10-CM

## 2024-12-13 DIAGNOSIS — H25.11 AGE-RELATED NUCLEAR CATARACT, RIGHT EYE: ICD-10-CM

## 2024-12-13 PROCEDURE — 76514 ECHO EXAM OF EYE THICKNESS: CPT | Performed by: STUDENT IN AN ORGANIZED HEALTH CARE EDUCATION/TRAINING PROGRAM

## 2024-12-13 PROCEDURE — 99214 OFFICE O/P EST MOD 30 MIN: CPT | Performed by: STUDENT IN AN ORGANIZED HEALTH CARE EDUCATION/TRAINING PROGRAM

## 2024-12-13 RX ORDER — LATANOPROST 50 UG/ML
0.005 % SOLUTION OPHTHALMIC
Qty: 3 | Refills: 3 | Status: ACTIVE
Start: 2024-12-13

## 2024-12-13 RX ORDER — BRIMONIDINE TARTRATE 2 MG/ML
0.2 % SOLUTION/ DROPS OPHTHALMIC
Qty: 3 | Refills: 3 | Status: ACTIVE
Start: 2024-12-13

## 2024-12-13 RX ORDER — DORZOLAMIDE HYDROCHLORIDE AND TIMOLOL MALEATE 20; 5 MG/ML; MG/ML
SOLUTION/ DROPS OPHTHALMIC
Qty: 3 | Refills: 3 | Status: ACTIVE
Start: 2024-12-13

## 2024-12-13 ASSESSMENT — INTRAOCULAR PRESSURE
OD: 16
OS: 14

## 2025-03-21 ENCOUNTER — OFFICE VISIT (OUTPATIENT)
Dept: URBAN - METROPOLITAN AREA CLINIC 64 | Facility: LOCATION | Age: 65
End: 2025-03-21
Payer: MEDICARE

## 2025-03-21 DIAGNOSIS — H40.1133 PRIMARY OPEN-ANGLE GLAUCOMA, BILATERAL, SEVERE STAGE: Primary | ICD-10-CM

## 2025-03-21 DIAGNOSIS — H25.11 AGE-RELATED NUCLEAR CATARACT, RIGHT EYE: ICD-10-CM

## 2025-03-21 DIAGNOSIS — H11.042 PERIPHERAL PTERYGIUM, STATIONARY, LEFT EYE: ICD-10-CM

## 2025-03-21 PROCEDURE — 99213 OFFICE O/P EST LOW 20 MIN: CPT | Performed by: STUDENT IN AN ORGANIZED HEALTH CARE EDUCATION/TRAINING PROGRAM

## 2025-03-21 ASSESSMENT — INTRAOCULAR PRESSURE
OS: 18
OD: 18

## 2025-08-01 ENCOUNTER — OFFICE VISIT (OUTPATIENT)
Dept: URBAN - METROPOLITAN AREA CLINIC 64 | Facility: LOCATION | Age: 65
End: 2025-08-01
Payer: MEDICARE

## 2025-08-01 DIAGNOSIS — H40.1133 PRIMARY OPEN-ANGLE GLAUCOMA, BILATERAL, SEVERE STAGE: Primary | ICD-10-CM

## 2025-08-01 DIAGNOSIS — H25.11 AGE-RELATED NUCLEAR CATARACT, RIGHT EYE: ICD-10-CM

## 2025-08-01 DIAGNOSIS — H11.042 PERIPHERAL PTERYGIUM, STATIONARY, LEFT EYE: ICD-10-CM

## 2025-08-01 PROCEDURE — 99213 OFFICE O/P EST LOW 20 MIN: CPT | Performed by: STUDENT IN AN ORGANIZED HEALTH CARE EDUCATION/TRAINING PROGRAM

## 2025-08-01 ASSESSMENT — INTRAOCULAR PRESSURE
OD: 12
OS: 14